# Patient Record
Sex: MALE | Race: WHITE | Employment: FULL TIME | URBAN - METROPOLITAN AREA
[De-identification: names, ages, dates, MRNs, and addresses within clinical notes are randomized per-mention and may not be internally consistent; named-entity substitution may affect disease eponyms.]

---

## 2024-12-06 ENCOUNTER — OFFICE VISIT (OUTPATIENT)
Dept: URGENT CARE | Age: 59
End: 2024-12-06
Payer: COMMERCIAL

## 2024-12-06 VITALS
SYSTOLIC BLOOD PRESSURE: 136 MMHG | HEART RATE: 77 BPM | DIASTOLIC BLOOD PRESSURE: 90 MMHG | RESPIRATION RATE: 18 BRPM | TEMPERATURE: 97.6 F | OXYGEN SATURATION: 98 %

## 2024-12-06 DIAGNOSIS — J06.9 ACUTE URI: Primary | ICD-10-CM

## 2024-12-06 RX ORDER — AMOXICILLIN AND CLAVULANATE POTASSIUM 875; 125 MG/1; MG/1
875 TABLET, FILM COATED ORAL 2 TIMES DAILY
Qty: 14 TABLET | Refills: 0 | Status: SHIPPED | OUTPATIENT
Start: 2024-12-06 | End: 2024-12-13

## 2024-12-06 ASSESSMENT — ENCOUNTER SYMPTOMS
RHINORRHEA: 1
SINUS PRESSURE: 1
SORE THROAT: 1
DIAPHORESIS: 0
SHORTNESS OF BREATH: 0
CHILLS: 0
FEVER: 0
COUGH: 1

## 2024-12-06 NOTE — PROGRESS NOTES
Subjective   Patient ID: Jose J Veras is a 59 y.o. male. They present today with a chief complaint of Sore Throat (Sore throat, cough,post nasal drip x Monday).    History of Present Illness  Dry cough, sore throat, nasal congestion/rhinorrhea/pressure/HA (not worse of life) started mon.    Patient insists he needs antibiotics.    Declines POCT      Sore Throat   Associated symptoms include congestion and coughing. Pertinent negatives include no ear pain or shortness of breath.       Past Medical History  Allergies as of 12/06/2024    (No Known Allergies)       (Not in a hospital admission)       History reviewed. No pertinent past medical history.    History reviewed. No pertinent surgical history.         Review of Systems  Review of Systems   Constitutional:  Negative for chills, diaphoresis and fever.   HENT:  Positive for congestion, rhinorrhea, sinus pressure and sore throat. Negative for ear pain.    Respiratory:  Positive for cough. Negative for shortness of breath.    Cardiovascular:  Negative for chest pain.   All other systems reviewed and are negative.                                 Objective    Vitals:    12/06/24 0809   BP: 136/90   Pulse: 77   Resp: 18   Temp: 36.4 °C (97.6 °F)   SpO2: 98%     No LMP for male patient.    Physical Exam  Vitals and nursing note reviewed.   Constitutional:       General: He is not in acute distress.  HENT:      Right Ear: Tympanic membrane normal.      Left Ear: Tympanic membrane normal.      Nose: Congestion and rhinorrhea present.      Mouth/Throat:      Pharynx: Posterior oropharyngeal erythema present. No oropharyngeal exudate.      Comments: Mild erythema, no swelling  Cardiovascular:      Rate and Rhythm: Normal rate and regular rhythm.      Heart sounds: Normal heart sounds.   Pulmonary:      Effort: Pulmonary effort is normal.      Breath sounds: Normal breath sounds.   Neurological:      Mental Status: He is alert.         Procedures    Point of Care Test &  Imaging Results from this visit  No results found for this visit on 12/06/24.   No results found.    Diagnostic study results (if any) were reviewed by Mayra Osorio PA-C.    Assessment/Plan   Allergies, medications, history, and pertinent labs/EKGs/Imaging reviewed by Mayra Osorio PA-C.         Orders and Diagnoses  There are no diagnoses linked to this encounter.    Medical Admin Record      Patient disposition: Home    Electronically signed by Mayra Osorio PA-C  8:18 AM

## 2025-04-18 ENCOUNTER — OFFICE VISIT (OUTPATIENT)
Dept: URGENT CARE | Age: 60
End: 2025-04-18
Payer: COMMERCIAL

## 2025-04-18 VITALS
HEART RATE: 70 BPM | RESPIRATION RATE: 19 BRPM | DIASTOLIC BLOOD PRESSURE: 80 MMHG | SYSTOLIC BLOOD PRESSURE: 137 MMHG | OXYGEN SATURATION: 97 % | TEMPERATURE: 97.4 F

## 2025-04-18 DIAGNOSIS — H61.21 IMPACTED CERUMEN OF RIGHT EAR: Primary | ICD-10-CM

## 2025-04-18 RX ORDER — MESALAMINE 500 MG/1
CAPSULE ORAL
COMMUNITY
Start: 2013-11-25

## 2025-04-18 ASSESSMENT — PATIENT HEALTH QUESTIONNAIRE - PHQ9
SUM OF ALL RESPONSES TO PHQ9 QUESTIONS 1 AND 2: 0
2. FEELING DOWN, DEPRESSED OR HOPELESS: NOT AT ALL
1. LITTLE INTEREST OR PLEASURE IN DOING THINGS: NOT AT ALL

## 2025-04-18 NOTE — PROGRESS NOTES
Subjective   Patient ID: Jose J Veras is a 60 y.o. male. They present today with a chief complaint of Ear Irrigation (Comes here once a year and needs it cleaned out. Right ear).    History of Present Illness  Patient is a pleasant 60-year-old white male, no significant past medical history, presented to clinic for chief complaint of ear irrigation.  Patient states around once time yearly he gets wax buildup in his ear which requires irrigation.  Denies any current pain however does report some decreased hearing in his right ear.  He denies any drainage.  No upper respiratory congestion rhinorrhea cough or sputum production.  No fever or chills.  No chest pain or shortness of breath.  No further complaints at this time.          Past Medical History  Allergies as of 04/18/2025    (No Known Allergies)       Prescriptions Prior to Admission[1]       Medical History[2]    Surgical History[3]     reports that he has never smoked. He has never used smokeless tobacco.    Review of Systems  Review of Systems                               Objective    Vitals:    04/18/25 1111   Pulse: 70   Resp: 19   Temp: 36.3 °C (97.4 °F)   SpO2: 97%     No LMP for male patient.    Physical Exam    General: Vitals Noted. No distress. Normocephalic.     HEENT: Left TM is within normal limits with adequate reflex and visible landmarks.  Right TM unable to be visualized due to cerumen impaction in the mid EAC.  EOMI, normal conjunctiva, patent nares, Normal OP    Neck: Supple with no adenopathy.     Cardiac: Regular Rate and Rhythm. No murmur.     Pulmonary: Equal breath sounds bilaterally. No wheezes, rhonchi, or rales.    Abdomen: Soft, non-tender, with normal bowel sounds.     Musculoskeletal: Moves all extremities, no effusion, no edema.     Skin: No obvious rashes.  Ear Cerumen Removal    Date/Time: 4/18/2025 11:19 AM    Performed by: Gilbert Chin PA-C  Authorized by: Gilbert Chin PA-C    Consent:     Consent obtained:   Verbal    Consent given by:  Patient    Risks, benefits, and alternatives were discussed: yes      Risks discussed:  Bleeding, dizziness, infection and TM perforation  Universal protocol:     Procedure explained and questions answered to patient or proxy's satisfaction: yes      Patient identity confirmed:  Verbally with patient  Procedure details:     Location:  L ear    Procedure type: irrigation      Procedure outcomes: cerumen removed    Post-procedure details:     Inspection:  No bleeding, ear canal clear and TM intact    Hearing quality:  Normal    Procedure completion:  Tolerated well, no immediate complications      Point of Care Test & Imaging Results from this visit    Imaging  No results found.    Cardiology, Vascular, and Other Imaging  No other imaging results found for the past 2 days      Diagnostic study results (if any) were reviewed by Gilbert Chin PA-C.    Assessment/Plan   Allergies, medications, history, and pertinent labs/EKGs/Imaging reviewed by Gilbert Chin PA-C.     Medical Decision Making  Patient was seen eval in the clinic for chief complaint of ear irrigation.  On exam patient is nontoxic well-appearing respect comfortably no acute distress.  Vital signs are stable, afebrile.  Chest is clear, heart is regular, belly is diffusely soft and nontender.  Evaluation of the right ear as above with an obvious cerumen impaction.  50-50 mixture of warm water hydroperoxide was used to irrigate the ear with complete removal of cerumen.  Evaluation status post procedure reveals an EAC that is within normal limits with an TM with adequate light reflex with visible landmarks.  Discharged home at this time.  Advise he follow-up with his primary care physician as needed.  Reviewed my impression, plan, strict return report to ED precautions with the patient.  He expresses understanding and agreement plan of care.    Orders and Diagnoses  Diagnoses and all orders for this visit:  Impacted  cerumen of right ear        Medical Admin Record      Follow Up Instructions  No follow-ups on file.    Patient disposition: Home    Electronically signed by Gilbert Chin PA-C  11:18 AM         [1] (Not in a hospital admission)  [2] No past medical history on file.  [3] No past surgical history on file.